# Patient Record
(demographics unavailable — no encounter records)

---

## 2025-05-15 NOTE — HISTORY OF PRESENT ILLNESS
[de-identified] : 31-year-old gentleman presents to my office with a long history of bilateral lateral elbow pain.  Left is worse than the right.  The pain occurs when he has to lift up objects.  He works for the [employer] and has to lift up objects.  Even lifting up the weight of a broom will cause pain in his elbow.  He is unable to take nonsteroidal anti-inflammatory medications due to an allergy which she had rash with ibuprofen.  He has not had any physical therapy.  No clear history of trauma.  No sensory complaints.  The pain radiates along the extensor mass of his wrist and digits.  Past medical history: Anxiety/depression  Medications:  Seroquel Risperdal  Allergies: Ibuprofen (rash)  Social: Lives alone, works for the parts department, non-smoker, former EtOH no drug use

## 2025-05-15 NOTE — ASSESSMENT
[FreeTextEntry1] : 31-year-old gentleman with bilateral lateral epicondylitis.  Left is worse than the right.  Recommend cortisone injection tennis elbow brace and physical therapy.  Diagnosis and plan reviewed with the patient.  Questions answered to his satisfaction agrees with plan.  Procedure: With the patient's consent and at his request utilizing standard sterile technique patient was provided an injection of lidocaine 1% (4 cc), Marcaine 0.25% (4 cc) and dexamethasone 10 mcg/cc (2 cc) into the left elbow lateral epicondyle.  Patient injection without issue.  Postinjection precautions were discussed.

## 2025-05-15 NOTE — IMAGING
[de-identified] : Pleasant young man sits reasonably comfortably in my office in no distress.  Physical examination: Bilateral elbows: Tenderness palpation over the lateral epicondyle of both elbows.  He is able to flex and extend his elbows without limitation.  Provocative testing on the left side elicits discomfort over the lateral epicondyle.  Less so on the right side.  He has unrestricted range of motion of wrist and digits with normal light sensation Menter fingers.  No thenar or hypothenar wasting.  Good capillary refill.  No epitrochlear lymph nodes.  Radiographs: Left elbow (AP, lateral, oblique): Normal x-ray.  No bony abnormalities.